# Patient Record
Sex: FEMALE | Race: WHITE | Employment: PART TIME | URBAN - NONMETROPOLITAN AREA
[De-identification: names, ages, dates, MRNs, and addresses within clinical notes are randomized per-mention and may not be internally consistent; named-entity substitution may affect disease eponyms.]

---

## 2017-01-19 ENCOUNTER — TELEPHONE (OUTPATIENT)
Dept: FAMILY MEDICINE CLINIC | Facility: CLINIC | Age: 67
End: 2017-01-19

## 2017-01-19 DIAGNOSIS — Z23 NEED FOR VACCINATION: Primary | ICD-10-CM

## 2017-01-19 RX ORDER — SULFAMETHOXAZOLE AND TRIMETHOPRIM 800; 160 MG/1; MG/1
1 TABLET ORAL 2 TIMES DAILY
Qty: 14 TABLET | Refills: 0 | Status: SHIPPED | OUTPATIENT
Start: 2017-01-19 | End: 2017-01-26

## 2017-01-19 NOTE — TELEPHONE ENCOUNTER
pt. was bit by the cat last/swollen and red/puncture wounds/claw marks. she is asking for antibotics. She is up at [place] with her , he is having surgery today.  Mirtha in [place]  45 and 32     Her cat last night bit and clawed her at right w

## 2017-05-22 ENCOUNTER — OFFICE VISIT (OUTPATIENT)
Dept: FAMILY MEDICINE CLINIC | Facility: CLINIC | Age: 67
End: 2017-05-22

## 2017-05-22 ENCOUNTER — LAB ENCOUNTER (OUTPATIENT)
Dept: LAB | Age: 67
End: 2017-05-22
Attending: INTERNAL MEDICINE
Payer: MEDICARE

## 2017-05-22 VITALS
SYSTOLIC BLOOD PRESSURE: 110 MMHG | RESPIRATION RATE: 18 BRPM | HEART RATE: 78 BPM | TEMPERATURE: 99 F | BODY MASS INDEX: 19.86 KG/M2 | HEIGHT: 63.5 IN | DIASTOLIC BLOOD PRESSURE: 72 MMHG | WEIGHT: 113.5 LBS

## 2017-05-22 DIAGNOSIS — M81.0 AGE-RELATED OSTEOPOROSIS WITHOUT CURRENT PATHOLOGICAL FRACTURE: ICD-10-CM

## 2017-05-22 DIAGNOSIS — Z23 NEED FOR VACCINATION: ICD-10-CM

## 2017-05-22 DIAGNOSIS — Z00.00 ENCOUNTER FOR ANNUAL HEALTH EXAMINATION: ICD-10-CM

## 2017-05-22 DIAGNOSIS — Z13.31 DEPRESSION SCREENING: ICD-10-CM

## 2017-05-22 DIAGNOSIS — L23.5 ALLERGIC DERMATITIS DUE TO OTHER CHEMICAL PRODUCT: ICD-10-CM

## 2017-05-22 DIAGNOSIS — E55.9 VITAMIN D DEFICIENCY: ICD-10-CM

## 2017-05-22 DIAGNOSIS — Z13.220 SCREENING FOR CHOLESTEROL LEVEL: ICD-10-CM

## 2017-05-22 DIAGNOSIS — L23.5 ALLERGIC DERMATITIS DUE TO OTHER CHEMICAL PRODUCT: Primary | ICD-10-CM

## 2017-05-22 DIAGNOSIS — Z12.31 VISIT FOR SCREENING MAMMOGRAM: ICD-10-CM

## 2017-05-22 DIAGNOSIS — Z78.0 POSTMENOPAUSAL: ICD-10-CM

## 2017-05-22 PROCEDURE — 82306 VITAMIN D 25 HYDROXY: CPT

## 2017-05-22 PROCEDURE — G0444 DEPRESSION SCREEN ANNUAL: HCPCS | Performed by: INTERNAL MEDICINE

## 2017-05-22 PROCEDURE — 36415 COLL VENOUS BLD VENIPUNCTURE: CPT

## 2017-05-22 PROCEDURE — G0009 ADMIN PNEUMOCOCCAL VACCINE: HCPCS | Performed by: INTERNAL MEDICINE

## 2017-05-22 PROCEDURE — 90670 PCV13 VACCINE IM: CPT | Performed by: INTERNAL MEDICINE

## 2017-05-22 PROCEDURE — G0439 PPPS, SUBSEQ VISIT: HCPCS | Performed by: INTERNAL MEDICINE

## 2017-05-22 PROCEDURE — 85025 COMPLETE CBC W/AUTO DIFF WBC: CPT

## 2017-05-22 PROCEDURE — 80061 LIPID PANEL: CPT

## 2017-05-22 PROCEDURE — 80053 COMPREHEN METABOLIC PANEL: CPT

## 2017-05-22 RX ORDER — PREDNISONE 20 MG/1
20 TABLET ORAL DAILY
Qty: 7 TABLET | Refills: 0 | Status: SHIPPED | OUTPATIENT
Start: 2017-05-22 | End: 2017-05-29

## 2017-05-22 NOTE — PROGRESS NOTES
HPI:   William Bains is a 74year old female who presents for a Medicare Subsequent Annual Wellness visit (Pt already had Initial Annual Wellness).     Pt has been in good health, she had a stress test last year, but turned out her had pneumonia on Ju heartburn  : denies dysuria, vaginal discharge or itching, no complaint of urinary incontinence   MUSCULOSKELETAL: denies back pain  NEURO: denies headaches  PSYCHE: denies depression or anxiety  HEMATOLOGIC: denies hx of anemia  ENDOCRINE: denies thyroi History   Administered Date(s) Administered   • Pneumococcal (Prevnar 13) 05/22/2017       ASSESSMENT AND OTHER RELEVANT CHRONIC CONDITIONS:   Maribel Madrigal is a 74year old female who presents for a Medicare Assessment.      PLAN SUMMARY:   Diagnoses and a Future    8. Postmenopausal  - Dexa Scan/Bone Density screening (Screening allowed every 2 years); Future    9. Vitamin D deficiency  Supplemented and recheck in 2 months. Ms. Bay Antoine does not currently take aspirin.  We discussed the risks and benefits of Yes    Hearing Problems?: No     Functional Status     Hearing Problems?: No    Vision Problems? : No    Difficulty walking?: No    Difficulty dressing or bathing?: No    Problems with daily activities? : No    Memory Problems?: No      Fall/Risk Assessmen 05/22/2017 98        EKG - w/ Initial Preventative Physical Exam only, or if medically necessary Electrocardiogram date       Colorectal Cancer Screening      Colonoscopy Screen every 10 years Colonoscopy,10 Years due on 04/20/2023 Update [facility] Kita Cuevas covered by Medicare Part B No vaccine history found This may be covered with your prescription benefits, but Medicare does not cover unless Medically needed    Zoster  Not covered by Medicare Part B No vaccine history found This may be covered with your ph

## 2017-05-22 NOTE — PATIENT INSTRUCTIONS
Warren Reid's SCREENING SCHEDULE   Tests on this list are recommended by your physician but may not be covered, or covered at this frequency, by your insurer. Please check with your insurance carrier before scheduling to verify coverage.    PREVENTATIVE if abnormal Colonoscopy,10 Years due on 04/20/2023 Update Health Maintenance if applicable    Flex Sigmoidoscopy Screen  Covered every 5 years No results found for this or any previous visit. No flowsheet data found.      Fecal Occult Blood   Covered Annual orders found for this or any previous visit. Please get once after your 65th birthday    Hepatitis B for Moderate/High Risk       No orders found for this or any previous visit.  Medium/high risk factors:   End-stage renal disease   Hemophiliacs who receive Procedure   Diabetes Screening      HbgA1C    At Least  Annually for Diabetics No results found for: A1C No flowsheet data found.     Fasting Blood Sugar (FSB)   Patient must be diagnosed with one of the following:   • Hypertension   • Dyslipidemia   • Obes Enema-   uncomfortable but covered  Covered but uncomfortable   Glaucoma Screening      Ophthalmology Visit   Covered annually for Diabetics, people with Glaucoma family history,   Americans over age 48   Americans over age 72 No flowsheet d institutions for the mentally retarded   Persons who live in the same house as a HepB virus carrier   Homosexual men   Illicit injectable drug abusers     Tetanus Toxoid- Only covered with a cut with metal- TD and TDaP Not covered by Medicare Part B)   Meme Davila

## 2017-05-23 PROBLEM — E55.9 VITAMIN D DEFICIENCY: Status: ACTIVE | Noted: 2017-05-23

## 2017-05-23 RX ORDER — ERGOCALCIFEROL 1.25 MG/1
50000 CAPSULE ORAL WEEKLY
Qty: 12 CAPSULE | Refills: 0 | Status: SHIPPED | OUTPATIENT
Start: 2017-05-23 | End: 2017-06-22

## 2017-05-23 NOTE — PROGRESS NOTES
Pt requested RX sent to [facility] in [place]. RX cx at [facility], [place], operated [facility] and sent to [address] [facility]

## 2017-06-12 ENCOUNTER — TELEPHONE (OUTPATIENT)
Dept: FAMILY MEDICINE CLINIC | Facility: CLINIC | Age: 67
End: 2017-06-12

## 2017-06-12 RX ORDER — PREDNISONE 20 MG/1
20 TABLET ORAL DAILY
Qty: 10 TABLET | Refills: 0 | Status: SHIPPED | OUTPATIENT
Start: 2017-06-12 | End: 2017-06-19

## 2017-06-12 NOTE — TELEPHONE ENCOUNTER
I can, but you must have some ingoing exposure like , makeup, sunscreen, See me again if it not improving.

## 2017-06-28 ENCOUNTER — OFFICE VISIT (OUTPATIENT)
Dept: FAMILY MEDICINE CLINIC | Facility: CLINIC | Age: 67
End: 2017-06-28

## 2017-06-28 VITALS
TEMPERATURE: 98 F | RESPIRATION RATE: 16 BRPM | DIASTOLIC BLOOD PRESSURE: 70 MMHG | HEART RATE: 90 BPM | BODY MASS INDEX: 19.95 KG/M2 | HEIGHT: 63.5 IN | SYSTOLIC BLOOD PRESSURE: 122 MMHG | WEIGHT: 114 LBS

## 2017-06-28 DIAGNOSIS — L30.8 OTHER ECZEMA: Primary | ICD-10-CM

## 2017-06-28 DIAGNOSIS — N76.0 ACUTE VAGINITIS: ICD-10-CM

## 2017-06-28 PROCEDURE — 99214 OFFICE O/P EST MOD 30 MIN: CPT | Performed by: INTERNAL MEDICINE

## 2017-06-28 RX ORDER — FLUCONAZOLE 150 MG/1
150 TABLET ORAL ONCE
Qty: 1 TABLET | Refills: 0 | Status: SHIPPED | OUTPATIENT
Start: 2017-06-28 | End: 2017-06-28

## 2017-06-28 NOTE — PROGRESS NOTES
James Serrato is a 74year old female. HPI:   Rash on her face is spreading. She had a peroral rash and now it is on the cheeks and circumorbital.  She used some prednisone which helped for a while, but now off and inflamed again.   She has a hx of eczema tenderness  EXTREMITIES: no cyanosis, clubbing or edema    ASSESSMENT AND PLAN:   Other eczema  (primary encounter diagnosis)  Acute vaginitis  Pt has been having some increased inflammatory changes of the skin on the face.   I have asked her to try a mediu

## 2018-02-01 ENCOUNTER — PATIENT OUTREACH (OUTPATIENT)
Dept: FAMILY MEDICINE CLINIC | Facility: CLINIC | Age: 68
End: 2018-02-01

## 2018-03-12 ENCOUNTER — TELEPHONE (OUTPATIENT)
Dept: FAMILY MEDICINE CLINIC | Facility: CLINIC | Age: 68
End: 2018-03-12

## 2018-03-12 NOTE — TELEPHONE ENCOUNTER
Patient said that she started over the weekend with a cough in her ribs and a cough. She denies a fever and no wheezing. She said she has a history of pneumonia. Offered and appointment tomorrow, she said she can't come in until Wed.  Appointment scheduled

## 2018-03-14 ENCOUNTER — OFFICE VISIT (OUTPATIENT)
Dept: FAMILY MEDICINE CLINIC | Facility: CLINIC | Age: 68
End: 2018-03-14

## 2018-03-14 VITALS
HEIGHT: 64.25 IN | BODY MASS INDEX: 19.63 KG/M2 | TEMPERATURE: 99 F | OXYGEN SATURATION: 96 % | DIASTOLIC BLOOD PRESSURE: 70 MMHG | HEART RATE: 82 BPM | WEIGHT: 115 LBS | SYSTOLIC BLOOD PRESSURE: 142 MMHG

## 2018-03-14 DIAGNOSIS — J44.1 COPD EXACERBATION (HCC): Primary | ICD-10-CM

## 2018-03-14 PROCEDURE — 99214 OFFICE O/P EST MOD 30 MIN: CPT | Performed by: INTERNAL MEDICINE

## 2018-03-14 RX ORDER — PREDNISONE 20 MG/1
20 TABLET ORAL DAILY
Qty: 7 TABLET | Refills: 0 | Status: SHIPPED | OUTPATIENT
Start: 2018-03-14 | End: 2018-03-21

## 2018-03-14 RX ORDER — CIPROFLOXACIN 500 MG/1
500 TABLET, FILM COATED ORAL 2 TIMES DAILY
Qty: 20 TABLET | Refills: 0 | Status: SHIPPED | OUTPATIENT
Start: 2018-03-14 | End: 2018-03-24

## 2018-03-14 RX ORDER — PROMETHAZINE HYDROCHLORIDE AND CODEINE PHOSPHATE 6.25; 1 MG/5ML; MG/5ML
2.5 SYRUP ORAL EVERY 4 HOURS PRN
Qty: 240 ML | Refills: 0 | Status: SHIPPED | OUTPATIENT
Start: 2018-03-14 | End: 2018-04-11 | Stop reason: ALTCHOICE

## 2018-03-14 NOTE — PROGRESS NOTES
HPI:   Nishant Charles is a 69year old female who presents for upper respiratory symptoms for  6  days. Patient reports congestion, dry cough, cough is keeping pt up at night.       Current Outpatient Prescriptions:  hydrocortisone 2.5 % External Cream Apply plan.  The patient is asked to return if sx's persist or worsen.

## 2018-04-11 ENCOUNTER — OFFICE VISIT (OUTPATIENT)
Dept: FAMILY MEDICINE CLINIC | Facility: CLINIC | Age: 68
End: 2018-04-11

## 2018-04-11 ENCOUNTER — HOSPITAL ENCOUNTER (OUTPATIENT)
Dept: GENERAL RADIOLOGY | Age: 68
Discharge: HOME | End: 2018-04-11
Attending: INTERNAL MEDICINE
Payer: MEDICARE

## 2018-04-11 VITALS
HEIGHT: 64 IN | WEIGHT: 111.38 LBS | BODY MASS INDEX: 19.01 KG/M2 | OXYGEN SATURATION: 98 % | TEMPERATURE: 99 F | SYSTOLIC BLOOD PRESSURE: 130 MMHG | DIASTOLIC BLOOD PRESSURE: 80 MMHG | HEART RATE: 67 BPM

## 2018-04-11 DIAGNOSIS — R07.1 CHEST PAIN ON BREATHING: ICD-10-CM

## 2018-04-11 DIAGNOSIS — R07.1 CHEST PAIN ON BREATHING: Primary | ICD-10-CM

## 2018-04-11 PROCEDURE — 99214 OFFICE O/P EST MOD 30 MIN: CPT | Performed by: INTERNAL MEDICINE

## 2018-04-11 PROCEDURE — 71046 X-RAY EXAM CHEST 2 VIEWS: CPT | Performed by: INTERNAL MEDICINE

## 2018-04-11 RX ORDER — PREDNISONE 20 MG/1
20 TABLET ORAL DAILY
Qty: 10 TABLET | Refills: 0 | Status: SHIPPED | OUTPATIENT
Start: 2018-04-11 | End: 2018-04-21

## 2018-04-11 NOTE — PROGRESS NOTES
HPI:   Adrien Meyers is a 69year old female who presents for upper respiratory symptoms for  1  months. Patient reports dry cough, coughing still when eating, pain in the right chest area now when she takes a deep breath.   She had a CXR without acute valenzuela not improved consider lung specialist and CT. [place] Marshall The patient indicates understanding of these issues and agrees to the plan. The patient is asked to return if sx's persist or worsen.

## 2018-04-24 ENCOUNTER — PATIENT OUTREACH (OUTPATIENT)
Dept: FAMILY MEDICINE CLINIC | Facility: CLINIC | Age: 68
End: 2018-04-24

## 2018-04-30 ENCOUNTER — TELEPHONE (OUTPATIENT)
Dept: FAMILY MEDICINE CLINIC | Facility: CLINIC | Age: 68
End: 2018-04-30

## 2018-04-30 DIAGNOSIS — J44.1 COPD EXACERBATION (HCC): Primary | ICD-10-CM

## 2018-04-30 NOTE — TELEPHONE ENCOUNTER
DID DR Johnny Castaneda EVER FIND ANYTHING WITH LOOKING AT OLD XRAY'S FROM WHEN SHE HAD PNEUMONIA LAST YEAR HAD TESTING DONE AT [facility]. SHE IS STILL NOT MUCH BETTER.    PLEASE ADVISE

## 2018-04-30 NOTE — TELEPHONE ENCOUNTER
No but this is way too long and she needs to be using a controller inhaler and be seen by pulmonology. I can refer to Dr Gus Bedolla and if wait is long then start inhaler myself.

## 2018-05-01 NOTE — TELEPHONE ENCOUNTER
Patient advised, she said that she got a message from the pharmacy that she has an inhaler waiting at the pharmacy that costs $360.  She said that she wants to know what is wrong with her, she is supposed to get a tooth pulled and said she needs to be in go

## 2018-05-01 NOTE — TELEPHONE ENCOUNTER
7/15/2016 Xray shows COPD changed as well as her last XRAY.    The previous XRAY reported pleural thickening which has resolved, BUT evaluation of the lungs includes ususaly PFT's and CT chest. I would like you to  a sample, inhalers are very expensi

## 2018-05-02 ENCOUNTER — MED REC SCAN ONLY (OUTPATIENT)
Dept: FAMILY MEDICINE CLINIC | Facility: CLINIC | Age: 68
End: 2018-05-02

## 2018-05-02 NOTE — TELEPHONE ENCOUNTER
Patient advised, referral faxed to Dr Nora Kelley. She said she does not want to start the inhaler until she sees the pulmonologist. She said she also had a CT scan of her chest in July 2016.

## 2018-06-06 ENCOUNTER — OFFICE VISIT (OUTPATIENT)
Dept: FAMILY MEDICINE CLINIC | Facility: CLINIC | Age: 68
End: 2018-06-06

## 2018-06-06 ENCOUNTER — LAB ENCOUNTER (OUTPATIENT)
Dept: LAB | Age: 68
End: 2018-06-06
Attending: INTERNAL MEDICINE
Payer: MEDICARE

## 2018-06-06 VITALS
DIASTOLIC BLOOD PRESSURE: 80 MMHG | WEIGHT: 111.38 LBS | BODY MASS INDEX: 19.01 KG/M2 | HEIGHT: 64 IN | SYSTOLIC BLOOD PRESSURE: 140 MMHG | OXYGEN SATURATION: 99 % | TEMPERATURE: 98 F | HEART RATE: 54 BPM

## 2018-06-06 DIAGNOSIS — Z11.59 NEED FOR HEPATITIS C SCREENING TEST: ICD-10-CM

## 2018-06-06 DIAGNOSIS — Z23 NEED FOR VACCINATION: ICD-10-CM

## 2018-06-06 DIAGNOSIS — K59.09 CHRONIC CONSTIPATION: ICD-10-CM

## 2018-06-06 DIAGNOSIS — E55.9 VITAMIN D DEFICIENCY: Primary | ICD-10-CM

## 2018-06-06 DIAGNOSIS — E55.9 VITAMIN D DEFICIENCY: ICD-10-CM

## 2018-06-06 DIAGNOSIS — Z00.00 ENCOUNTER FOR ANNUAL HEALTH EXAMINATION: ICD-10-CM

## 2018-06-06 DIAGNOSIS — Z12.11 SCREENING FOR COLON CANCER: ICD-10-CM

## 2018-06-06 PROCEDURE — 82306 VITAMIN D 25 HYDROXY: CPT

## 2018-06-06 PROCEDURE — 86803 HEPATITIS C AB TEST: CPT

## 2018-06-06 PROCEDURE — G0439 PPPS, SUBSEQ VISIT: HCPCS | Performed by: INTERNAL MEDICINE

## 2018-06-06 PROCEDURE — 36415 COLL VENOUS BLD VENIPUNCTURE: CPT

## 2018-06-06 PROCEDURE — G0009 ADMIN PNEUMOCOCCAL VACCINE: HCPCS | Performed by: INTERNAL MEDICINE

## 2018-06-06 PROCEDURE — 90732 PPSV23 VACC 2 YRS+ SUBQ/IM: CPT | Performed by: INTERNAL MEDICINE

## 2018-06-06 PROCEDURE — 80053 COMPREHEN METABOLIC PANEL: CPT

## 2018-06-06 PROCEDURE — 85025 COMPLETE CBC W/AUTO DIFF WBC: CPT

## 2018-06-06 PROCEDURE — 80061 LIPID PANEL: CPT

## 2018-06-06 NOTE — PROGRESS NOTES
HPI:   Fabien Bhakta is a 69year old female who presents for a Medicare Subsequent Annual Wellness visit (Pt already had Initial Annual Wellness). Pt hates these visits does not uunderstand why they are necessary.   Did start pneumonia vaccine last ye explanation and discussion of advance directives standard forms performed Face to Face with patient and Family/surrogate (if present), and forms available to patient in AVS         She smoked tobacco in the past but quit greater than 12 months ago.   Aguilar alcohol. She reports that she does not use drugs.      REVIEW OF SYSTEMS:   GENERAL: feels well otherwise  SKIN: denies any unusual skin lesions  EYES: denies blurred vision or double vision  HEENT: denies nasal congestion, sinus pain or ST  LUNGS: denies s non-tender, bowel sounds active all four quadrants,  no masses, no organomegaly   Pelvic: Deferred   Extremities: Extremities normal, atraumatic, no cyanosis or edema   Pulses: 2+ and symmetric   Skin: Skin color, texture, turgor normal, no rashes or lesio poor?: No  How does the patient maintain a good energy level?: Other  How would you describe your daily physical activity?: Heavy  How would you describe your current health state?: Good  How do you maintain positive mental well-being?: Social Interaction; applicable     Immunizations (Update Immunization Activity if applicable)     Influenza  Covered Annually  Please get every year    Pneumococcal 13 (Prevnar)  Covered Once after 65 05/22/2017 Please get once after your 65th birthday    Pneumococcal 23 (Pne

## 2018-06-06 NOTE — PATIENT INSTRUCTIONS
Larissa Reid's SCREENING SCHEDULE   Tests on this list are recommended by your physician but may not be covered, or covered at this frequency, by your insurer. Please check with your insurance carrier before scheduling to verify coverage.    PREVENTATIVE often if abnormal Colonoscopy,10 Years due on 04/20/2023 Update Health Maintenance if applicable    Flex Sigmoidoscopy Screen  Covered every 5 years No results found for this or any previous visit. No flowsheet data found.      Fecal Occult Blood   Covered (Pneumovax)  Covered Once after 65   Orders placed or performed in visit on 06/06/18  -PNEUMOCOCCAL IMM (PNEUMOVAX)    Please get once after your 65th birthday    Hepatitis B for Moderate/High Risk       No orders found for this or any previous visit.  Medi

## 2018-07-12 ENCOUNTER — MED REC SCAN ONLY (OUTPATIENT)
Dept: FAMILY MEDICINE CLINIC | Facility: CLINIC | Age: 68
End: 2018-07-12

## 2019-03-06 ENCOUNTER — PATIENT OUTREACH (OUTPATIENT)
Dept: FAMILY MEDICINE CLINIC | Facility: CLINIC | Age: 69
End: 2019-03-06

## 2019-03-19 ENCOUNTER — PATIENT OUTREACH (OUTPATIENT)
Dept: FAMILY MEDICINE CLINIC | Facility: CLINIC | Age: 69
End: 2019-03-19

## 2019-06-12 ENCOUNTER — OFFICE VISIT (OUTPATIENT)
Dept: FAMILY MEDICINE CLINIC | Facility: CLINIC | Age: 69
End: 2019-06-12
Payer: MEDICARE

## 2019-06-12 ENCOUNTER — LAB ENCOUNTER (OUTPATIENT)
Dept: LAB | Age: 69
End: 2019-06-12
Attending: INTERNAL MEDICINE
Payer: MEDICARE

## 2019-06-12 VITALS
HEART RATE: 67 BPM | DIASTOLIC BLOOD PRESSURE: 88 MMHG | TEMPERATURE: 99 F | SYSTOLIC BLOOD PRESSURE: 138 MMHG | BODY MASS INDEX: 19.04 KG/M2 | HEIGHT: 64 IN | WEIGHT: 111.5 LBS | OXYGEN SATURATION: 98 %

## 2019-06-12 DIAGNOSIS — J44.1 COPD EXACERBATION (HCC): ICD-10-CM

## 2019-06-12 DIAGNOSIS — E55.9 VITAMIN D DEFICIENCY: ICD-10-CM

## 2019-06-12 DIAGNOSIS — Z00.00 ENCOUNTER FOR ANNUAL HEALTH EXAMINATION: ICD-10-CM

## 2019-06-12 DIAGNOSIS — E55.9 VITAMIN D DEFICIENCY: Primary | ICD-10-CM

## 2019-06-12 DIAGNOSIS — K59.09 CHRONIC CONSTIPATION: ICD-10-CM

## 2019-06-12 DIAGNOSIS — Z12.31 VISIT FOR SCREENING MAMMOGRAM: ICD-10-CM

## 2019-06-12 PROCEDURE — 82306 VITAMIN D 25 HYDROXY: CPT

## 2019-06-12 PROCEDURE — 85025 COMPLETE CBC W/AUTO DIFF WBC: CPT

## 2019-06-12 PROCEDURE — 80061 LIPID PANEL: CPT

## 2019-06-12 PROCEDURE — 80053 COMPREHEN METABOLIC PANEL: CPT

## 2019-06-12 PROCEDURE — 36415 COLL VENOUS BLD VENIPUNCTURE: CPT

## 2019-06-12 PROCEDURE — G0439 PPPS, SUBSEQ VISIT: HCPCS | Performed by: INTERNAL MEDICINE

## 2019-06-12 NOTE — PATIENT INSTRUCTIONS
Semaj Reid's SCREENING SCHEDULE   Tests on this list are recommended by your physician but may not be covered, or covered at this frequency, by your insurer. Please check with your insurance carrier before scheduling to verify coverage.    PREVENTATIVE Colonoscopy due on 08/06/2028 Update [facility] if applicable    Flex Sigmoidoscopy Screen  Covered every 5 years No results found for this or any previous visit. No flowsheet data found.      Fecal Occult Blood   Covered Annually No results found f 70 Orders placed or performed in visit on 06/06/18   • PNEUMOCOCCAL IMM (PNEUMOVAX)    Please get once after your 65th birthday    Hepatitis B for Moderate/High Risk       No orders found for this or any previous visit.  Medium/high risk factors:   End-stag

## 2019-06-12 NOTE — PROGRESS NOTES
HPI:   Amira Juan is a 77year old female who presents for a Medicare Subsequent Annual Wellness visit (Pt already had Initial Annual Wellness). Pt has been fine. She has complains. She is irritated by this appointment.   Daughter was positive for and had a score of 0 so is at low risk.     Patient Care Team: Patient Care Team:  Marco Sexton MD as PCP - [facility])  Marco Sexton MD as PCP - [facility]    Patient Active Problem List:     Eczema     Vitamin D deficiency    Wt Readings from depression or anxiety  HEMATOLOGIC: denies hx of anemia  ENDOCRINE: denies thyroid history  ALL/ASTHMA: denies hx of allergy or asthma    EXAM:   /80 (BP Location: Right arm, Patient Position: Sitting, Cuff Size: adult)   Pulse 67   Temp 99 °F (37.2 MULTIDOSE VIAL (79822) FLU CLINIC 10/07/2016   • Pneumovax 23 10/07/2016   • Td, Preserv Free 05/22/2017        ASSESSMENT AND OTHER RELEVANT CHRONIC CONDITIONS:   Maribel Madrigal is a 77year old female who presents for a Medicare Assessment.      PLAN [facility] Disease Screening     LDL Annually LDL Cholesterol (mg/dL)   Date Value   06/06/2018 90        EKG - w/ Initial Preventative Physical Exam only, or if medically necessary Electrocardiogram date       Colorectal Cancer Screening      Colonoscopy Screen ever with a cut with metal- TD and TDaP Not covered by Medicare Part B No vaccine history found This may be covered with your prescription benefits, but Medicare does not cover unless Medically needed    Zoster  Not covered by Medicare Part B No vaccine history

## 2019-07-12 ENCOUNTER — PATIENT OUTREACH (OUTPATIENT)
Dept: FAMILY MEDICINE CLINIC | Facility: CLINIC | Age: 69
End: 2019-07-12

## 2019-07-12 NOTE — PROGRESS NOTES
Left message on answering machine for patient to return phone call. Patient is overdue for mammogram.  No future appointments.

## 2019-08-13 ENCOUNTER — APPOINTMENT (OUTPATIENT)
Dept: GENERAL RADIOLOGY | Facility: HOSPITAL | Age: 69
End: 2019-08-13
Attending: EMERGENCY MEDICINE
Payer: OTHER MISCELLANEOUS

## 2019-08-13 ENCOUNTER — HOSPITAL ENCOUNTER (EMERGENCY)
Facility: HOSPITAL | Age: 69
Discharge: HOME | End: 2019-08-13
Attending: EMERGENCY MEDICINE
Payer: OTHER MISCELLANEOUS

## 2019-08-13 VITALS
HEART RATE: 78 BPM | DIASTOLIC BLOOD PRESSURE: 76 MMHG | RESPIRATION RATE: 16 BRPM | SYSTOLIC BLOOD PRESSURE: 129 MMHG | OXYGEN SATURATION: 100 % | TEMPERATURE: 98 F
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
## 2019-08-13 DIAGNOSIS — S20.212A CONTUSION OF LEFT CHEST WALL, INITIAL ENCOUNTER: ICD-10-CM

## 2019-08-13 DIAGNOSIS — S62.102A CLOSED FRACTURE OF LEFT WRIST, INITIAL ENCOUNTER: ICD-10-CM

## 2019-08-13 DIAGNOSIS — S00.83XA CONTUSION OF FACE, INITIAL ENCOUNTER: ICD-10-CM

## 2019-08-13 PROCEDURE — 73110 X-RAY EXAM OF WRIST: CPT | Performed by: EMERGENCY MEDICINE

## 2019-08-13 PROCEDURE — 99284 EMERGENCY DEPT VISIT MOD MDM: CPT

## 2019-08-13 PROCEDURE — 71101 X-RAY EXAM UNILAT RIBS/CHEST: CPT | Performed by: EMERGENCY MEDICINE

## 2019-08-13 PROCEDURE — 96374 THER/PROPH/DIAG INJ IV PUSH: CPT

## 2019-08-13 PROCEDURE — 29105 APPLICATION LONG ARM SPLINT: CPT

## 2019-08-13 RX ORDER — KETOROLAC TROMETHAMINE 30 MG/ML
15 INJECTION, SOLUTION INTRAMUSCULAR; INTRAVENOUS ONCE
Status: COMPLETED | OUTPATIENT
Start: 2019-08-13 | End: 2019-08-13

## 2019-08-13 RX ORDER — TRAMADOL HYDROCHLORIDE 50 MG/1
TABLET ORAL EVERY 6 HOURS PRN
Qty: 10 TABLET | Refills: 0 | Status: SHIPPED | OUTPATIENT
Start: 2019-08-13 | End: 2019-08-20

## 2019-08-13 RX ORDER — NAPROXEN 500 MG/1
500 TABLET ORAL 2 TIMES DAILY PRN
Qty: 20 TABLET | Refills: 0 | Status: SHIPPED | OUTPATIENT
Start: 2019-08-13 | End: 2019-08-20

## 2019-08-13 NOTE — ED PROVIDER NOTES
Patient Seen in: [facility] Emergency Department    History   Patient presents with:  Fall (musculoskeletal, neurologic)    Stated Complaint: Fall    HPI    68-year-old female presents to the emergency department after a trip and fall.   Patient stat Eyes: Pupils are equal, round, and reactive to light. EOM are normal.   Neck: Normal range of motion. Neck supple. Cardiovascular: Normal rate, regular rhythm, normal heart sounds and intact distal pulses.    Pulmonary/Chest: Effort normal and breath soun PROCEDURE:  XR WRIST COMPLETE (MIN 3 VIEWS), LEFT (CPT=73110)  TECHNIQUE:  Three views were obtained. COMPARISON:  None.   INDICATIONS:  Fall  PATIENT STATED HISTORY: (As transcribed by Technologist)  Patient fell at work and landed on her left wrist and r Patient had had an IV established prior to coming to the emergency department. She does not wish to have any \"strong\" pain medications. She was given a dose of Toradol. She had ice applied to her wrist.  She had the above x-rays.   I reviewed the resul

## 2019-08-14 ENCOUNTER — TELEPHONE (OUTPATIENT)
Dept: FAMILY MEDICINE CLINIC | Facility: CLINIC | Age: 69
End: 2019-08-14

## 2019-08-14 DIAGNOSIS — S62.102S CLOSED FRACTURE OF LEFT WRIST, SEQUELA: Primary | ICD-10-CM

## 2019-08-14 DIAGNOSIS — Z78.0 POST-MENOPAUSAL: ICD-10-CM

## 2019-08-14 NOTE — TELEPHONE ENCOUNTER
Patient fell and was at [facility] yesterday, she has a wrist fracture. She is looking to get ij to see an orthopedic today. Appointment scheduled with Mykel LAYNE with Dr Donald Costa with [address] at 215pm today in [place]. Referral placed.  H

## 2019-08-16 ENCOUNTER — HOSPITAL ENCOUNTER (OUTPATIENT)
Dept: CT IMAGING | Age: 69
Discharge: HOME | End: 2019-08-16
Attending: PHYSICIAN ASSISTANT
Payer: OTHER MISCELLANEOUS

## 2019-08-16 DIAGNOSIS — S52.502A CLOSED FRACTURE OF DISTAL END OF LEFT RADIUS, UNSPECIFIED FRACTURE MORPHOLOGY, INITIAL ENCOUNTER: ICD-10-CM

## 2019-08-16 PROCEDURE — 76376 3D RENDER W/INTRP POSTPROCES: CPT

## 2019-08-16 PROCEDURE — 73200 CT UPPER EXTREMITY W/O DYE: CPT | Performed by: PHYSICIAN ASSISTANT

## 2019-08-20 PROBLEM — S52.502D CLOSED FRACTURE OF DISTAL END OF LEFT RADIUS WITH ROUTINE HEALING, UNSPECIFIED FRACTURE MORPHOLOGY, SUBSEQUENT ENCOUNTER: Status: ACTIVE | Noted: 2019-08-20

## 2019-08-20 PROBLEM — S52.572A OTHER CLOSED INTRA-ARTICULAR FRACTURE OF DISTAL END OF LEFT RADIUS, INITIAL ENCOUNTER: Status: ACTIVE | Noted: 2019-08-20

## 2019-08-22 ENCOUNTER — OFFICE VISIT (OUTPATIENT)
Dept: FAMILY MEDICINE CLINIC | Facility: CLINIC | Age: 69
End: 2019-08-22
Payer: OTHER MISCELLANEOUS

## 2019-08-22 VITALS
RESPIRATION RATE: 12 BRPM | HEART RATE: 72 BPM | BODY MASS INDEX: 18.78 KG/M2 | WEIGHT: 110 LBS | SYSTOLIC BLOOD PRESSURE: 124 MMHG | TEMPERATURE: 98 F | HEIGHT: 64 IN | DIASTOLIC BLOOD PRESSURE: 80 MMHG

## 2019-08-22 DIAGNOSIS — S62.101G CLOSED FRACTURE OF RIGHT WRIST WITH DELAYED HEALING, SUBSEQUENT ENCOUNTER: Primary | ICD-10-CM

## 2019-08-22 DIAGNOSIS — J43.2 CENTRILOBULAR EMPHYSEMA (HCC): ICD-10-CM

## 2019-08-22 LAB
ALBUMIN SERPL-MCNC: 4.1 G/DL (ref 3.4–5)
ALBUMIN/GLOB SERPL: 1.2 {RATIO} (ref 1–2)
ALP LIVER SERPL-CCNC: 167 U/L (ref 55–142)
ALT SERPL-CCNC: 23 U/L (ref 13–56)
ANION GAP SERPL CALC-SCNC: 6 MMOL/L (ref 0–18)
AST SERPL-CCNC: 21 U/L (ref 15–37)
BASOPHILS # BLD AUTO: 0.03 X10(3) UL (ref 0–0.2)
BASOPHILS NFR BLD AUTO: 0.5 %
BILIRUB SERPL-MCNC: 0.6 MG/DL (ref 0.1–2)
BUN BLD-MCNC: 13 MG/DL (ref 7–18)
BUN/CREAT SERPL: 21.7 (ref 10–20)
CALCIUM BLD-MCNC: 9.1 MG/DL (ref 8.5–10.1)
CHLORIDE SERPL-SCNC: 104 MMOL/L (ref 98–112)
CO2 SERPL-SCNC: 30 MMOL/L (ref 21–32)
CREAT BLD-MCNC: 0.6 MG/DL (ref 0.55–1.02)
DEPRECATED RDW RBC AUTO: 41 FL (ref 35.1–46.3)
EOSINOPHIL # BLD AUTO: 0.11 X10(3) UL (ref 0–0.7)
EOSINOPHIL NFR BLD AUTO: 2 %
ERYTHROCYTE [DISTWIDTH] IN BLOOD BY AUTOMATED COUNT: 12 % (ref 11–15)
GLOBULIN PLAS-MCNC: 3.4 G/DL (ref 2.8–4.4)
GLUCOSE BLD-MCNC: 93 MG/DL (ref 70–99)
HCT VFR BLD AUTO: 37.9 % (ref 35–48)
HGB BLD-MCNC: 12.5 G/DL (ref 12–16)
IMM GRANULOCYTES # BLD AUTO: 0.01 X10(3) UL (ref 0–1)
IMM GRANULOCYTES NFR BLD: 0.2 %
LYMPHOCYTES # BLD AUTO: 1.52 X10(3) UL (ref 1–4)
LYMPHOCYTES NFR BLD AUTO: 27.1 %
M PROTEIN MFR SERPL ELPH: 7.5 G/DL (ref 6.4–8.2)
MCH RBC QN AUTO: 30.3 PG (ref 26–34)
MCHC RBC AUTO-ENTMCNC: 33 G/DL (ref 31–37)
MCV RBC AUTO: 91.8 FL (ref 80–100)
MONOCYTES # BLD AUTO: 0.44 X10(3) UL (ref 0.1–1)
MONOCYTES NFR BLD AUTO: 7.9 %
NEUTROPHILS # BLD AUTO: 3.49 X10 (3) UL (ref 1.5–7.7)
NEUTROPHILS # BLD AUTO: 3.49 X10(3) UL (ref 1.5–7.7)
NEUTROPHILS NFR BLD AUTO: 62.3 %
OSMOLALITY SERPL CALC.SUM OF ELEC: 290 MOSM/KG (ref 275–295)
PLATELET # BLD AUTO: 373 10(3)UL (ref 150–450)
POTASSIUM SERPL-SCNC: 3.9 MMOL/L (ref 3.5–5.1)
RBC # BLD AUTO: 4.13 X10(6)UL (ref 3.8–5.3)
SODIUM SERPL-SCNC: 140 MMOL/L (ref 136–145)
WBC # BLD AUTO: 5.6 X10(3) UL (ref 4–11)

## 2019-08-22 PROCEDURE — 80053 COMPREHEN METABOLIC PANEL: CPT | Performed by: INTERNAL MEDICINE

## 2019-08-22 PROCEDURE — 99214 OFFICE O/P EST MOD 30 MIN: CPT | Performed by: INTERNAL MEDICINE

## 2019-08-22 PROCEDURE — 85025 COMPLETE CBC W/AUTO DIFF WBC: CPT | Performed by: INTERNAL MEDICINE

## 2019-08-22 PROCEDURE — 93000 ELECTROCARDIOGRAM COMPLETE: CPT | Performed by: INTERNAL MEDICINE

## 2019-08-22 RX ORDER — IBUPROFEN 200 MG
200 TABLET ORAL EVERY 6 HOURS PRN
COMMUNITY

## 2019-08-22 NOTE — PROGRESS NOTES
Romero Whitley is a 68year old female who presents for a pre-operative physical exam. Patient is to have right wrist repair, to be done by Dr. Mikey Dolan at [address] HPI:   Pt complains of right wrist fracture 8/13/2019 at work.    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

## 2019-08-26 ENCOUNTER — TELEPHONE (OUTPATIENT)
Dept: FAMILY MEDICINE CLINIC | Facility: CLINIC | Age: 69
End: 2019-08-26

## 2019-08-27 ENCOUNTER — TELEPHONE (OUTPATIENT)
Dept: FAMILY MEDICINE CLINIC | Facility: CLINIC | Age: 69
End: 2019-08-27

## 2019-09-03 ENCOUNTER — MED REC SCAN ONLY (OUTPATIENT)
Dept: FAMILY MEDICINE CLINIC | Facility: CLINIC | Age: 69
End: 2019-09-03

## 2020-01-29 ENCOUNTER — OFFICE VISIT (OUTPATIENT)
Dept: FAMILY MEDICINE CLINIC | Facility: CLINIC | Age: 70
End: 2020-01-29
Payer: MEDICARE

## 2020-01-29 VITALS
WEIGHT: 109.13 LBS | RESPIRATION RATE: 20 BRPM | BODY MASS INDEX: 19 KG/M2 | TEMPERATURE: 99 F | DIASTOLIC BLOOD PRESSURE: 74 MMHG | HEART RATE: 68 BPM | SYSTOLIC BLOOD PRESSURE: 140 MMHG

## 2020-01-29 DIAGNOSIS — G89.29 CHRONIC RIGHT-SIDED THORACIC BACK PAIN: Primary | ICD-10-CM

## 2020-01-29 DIAGNOSIS — J43.2 CENTRILOBULAR EMPHYSEMA (HCC): ICD-10-CM

## 2020-01-29 DIAGNOSIS — M54.6 CHRONIC RIGHT-SIDED THORACIC BACK PAIN: Primary | ICD-10-CM

## 2020-01-29 PROCEDURE — 99214 OFFICE O/P EST MOD 30 MIN: CPT | Performed by: INTERNAL MEDICINE

## 2020-01-29 NOTE — PROGRESS NOTES
Judith Nugent is a 68year old female. HPI:   Pt had a fracture in the left arm in August, hand has not recovered completely. Has back pain with sitting for long periods of time, worse in the thoracic spine. She has not yet done a DEXA scan.   She takes a bruits  LUNGS: clear to auscultation  CARDIO: RRR without murmur  GI: good BS's,no masses, HSM or tenderness  EXTREMITIES: no cyanosis, clubbing or edema    ASSESSMENT AND PLAN:     Chronic right-sided thoracic back pain  (primary encounter diagnosis)  Shweta

## 2020-01-30 ENCOUNTER — HOSPITAL ENCOUNTER (OUTPATIENT)
Dept: GENERAL RADIOLOGY | Age: 70
Discharge: HOME | End: 2020-01-30
Attending: INTERNAL MEDICINE
Payer: MEDICARE

## 2020-01-30 ENCOUNTER — HOSPITAL ENCOUNTER (OUTPATIENT)
Dept: BONE DENSITY | Age: 70
Discharge: HOME | End: 2020-01-30
Attending: INTERNAL MEDICINE
Payer: MEDICARE

## 2020-01-30 DIAGNOSIS — G89.29 CHRONIC RIGHT-SIDED THORACIC BACK PAIN: ICD-10-CM

## 2020-01-30 DIAGNOSIS — Z78.0 POST-MENOPAUSAL: ICD-10-CM

## 2020-01-30 DIAGNOSIS — M54.6 CHRONIC RIGHT-SIDED THORACIC BACK PAIN: ICD-10-CM

## 2020-01-30 PROCEDURE — 77080 DXA BONE DENSITY AXIAL: CPT | Performed by: INTERNAL MEDICINE

## 2020-01-30 PROCEDURE — 72072 X-RAY EXAM THORAC SPINE 3VWS: CPT | Performed by: INTERNAL MEDICINE

## 2020-01-30 RX ORDER — ALENDRONATE SODIUM 70 MG/1
70 TABLET ORAL WEEKLY
Qty: 12 TABLET | Refills: 3 | Status: SHIPPED | OUTPATIENT
Start: 2020-01-30 | End: 2020-04-29

## 2020-02-03 ENCOUNTER — TELEPHONE (OUTPATIENT)
Dept: FAMILY MEDICINE CLINIC | Facility: CLINIC | Age: 70
End: 2020-02-03

## 2020-04-06 ENCOUNTER — TELEPHONE (OUTPATIENT)
Dept: FAMILY MEDICINE CLINIC | Facility: CLINIC | Age: 70
End: 2020-04-06

## 2020-04-06 RX ORDER — GENTAMICIN SULFATE 3 MG/ML
1 SOLUTION/ DROPS OPHTHALMIC 4 TIMES DAILY
Qty: 5 ML | Refills: 0 | Status: SHIPPED | OUTPATIENT
Start: 2020-04-06 | End: 2020-04-11

## 2020-04-06 NOTE — TELEPHONE ENCOUNTER
PT. THINKS SHE HAS A STYE ON HER EYE, ON UPPER LID. PT. AWARE IF DOCTOR CALLS BACK THEY WILL CHARGE INSURANCE FOR A PHONE VISIT AND SHE IS FINE WITH THAT.

## 2020-04-06 NOTE — TELEPHONE ENCOUNTER
Patient states that she has a stye on her upper eye lid x 8 days. Patient was using warm tea bags. Stye did drain. Eye lid is still swollen and red. Upon waking up, eye is crusted shut. Patient is ok with televisit if needed.

## 2020-11-05 ENCOUNTER — OFFICE VISIT (OUTPATIENT)
Dept: FAMILY MEDICINE CLINIC | Facility: CLINIC | Age: 70
End: 2020-11-05
Payer: MEDICARE

## 2020-11-05 VITALS
TEMPERATURE: 99 F | BODY MASS INDEX: 19.81 KG/M2 | HEART RATE: 87 BPM | HEIGHT: 64 IN | OXYGEN SATURATION: 98 % | RESPIRATION RATE: 16 BRPM | DIASTOLIC BLOOD PRESSURE: 70 MMHG | WEIGHT: 116 LBS | SYSTOLIC BLOOD PRESSURE: 116 MMHG

## 2020-11-05 DIAGNOSIS — G89.29 CHRONIC RIGHT-SIDED THORACIC BACK PAIN: ICD-10-CM

## 2020-11-05 DIAGNOSIS — M54.6 CHRONIC RIGHT-SIDED THORACIC BACK PAIN: ICD-10-CM

## 2020-11-05 DIAGNOSIS — J43.2 CENTRILOBULAR EMPHYSEMA (HCC): ICD-10-CM

## 2020-11-05 DIAGNOSIS — Z00.00 ENCOUNTER FOR ANNUAL HEALTH EXAMINATION: Primary | ICD-10-CM

## 2020-11-05 DIAGNOSIS — Z78.0 POST-MENOPAUSAL: ICD-10-CM

## 2020-11-05 DIAGNOSIS — K59.09 CHRONIC CONSTIPATION: ICD-10-CM

## 2020-11-05 DIAGNOSIS — E55.9 VITAMIN D DEFICIENCY: ICD-10-CM

## 2020-11-05 PROCEDURE — G0439 PPPS, SUBSEQ VISIT: HCPCS | Performed by: INTERNAL MEDICINE

## 2020-11-05 RX ORDER — POLYETHYLENE GLYCOL 3350 17 G/17G
17 POWDER, FOR SOLUTION ORAL DAILY
Qty: 527 G | Refills: 0 | Status: SHIPPED | OUTPATIENT
Start: 2020-11-05 | End: 2020-11-08

## 2020-11-05 NOTE — PROGRESS NOTES
HPI:   Adrien Meyers is a 65year old female who presents for a Medicare Subsequent Annual Wellness visit (Pt already had Initial Annual Wellness). Pt has been well, she has osteoporosis and had a wrist fracture left with pinning, slipped at work.  She Use      Smoking status: Former Smoker        Packs/day: 1.00        Years: 10.00        Pack years: 10      Smokeless tobacco: Never Used         CAGE Alcohol screening   Gayle Henson was screened for Alcohol abuse and had a score of 0 so is at low risk. Cancer in her father; Other in her mother. SOCIAL HISTORY:   She  reports that she has quit smoking. She has a 10.00 pack-year smoking history. She has never used smokeless tobacco. She reports current alcohol use.  She reports that she does not use drugs examination         Diet assessment: good     PLAN:  The patient indicates understanding of these issues and agrees to the plan. Reinforced healthy diet, lifestyle, and exercise. No follow-ups on file.      Nasrin Meneses MD, 11/5/2020     [facility] There are no preventive care reminders to display for this patient. Update Health Maintenance if applicable    Chlamydia  Annually if high risk No results found for: CHLAMYDIA No flowsheet data found.     Screening Mammogram      Mammogram Annually to 74, t

## 2020-11-05 NOTE — PATIENT INSTRUCTIONS
Kimmy Reid's SCREENING SCHEDULE   Tests on this list are recommended by your physician but may not be covered, or covered at this frequency, by your insurer. Please check with your insurance carrier before scheduling to verify coverage.    PREVENTATIVE abnormal Colonoscopy due on 08/06/2028 Update [facility] if applicable    Flex Sigmoidoscopy Screen  Covered every 5 years No results found for this or any previous visit. No flowsheet data found.      Fecal Occult Blood   Covered Annually No result (Pneumovax)  Covered Once after 65 Orders placed or performed in visit on 06/06/18   • PNEUMOCOCCAL IMM (PNEUMOVAX)    Please get once after your 65th birthday    Hepatitis B for Moderate/High Risk       No orders found for this or any previous visit.  Medi

## 2021-03-12 DIAGNOSIS — Z23 NEED FOR VACCINATION: ICD-10-CM

## 2021-05-11 ENCOUNTER — TELEPHONE (OUTPATIENT)
Dept: FAMILY MEDICINE CLINIC | Facility: CLINIC | Age: 71
End: 2021-05-11

## 2021-05-11 NOTE — TELEPHONE ENCOUNTER
Luis Jurado advised. She said she did tell the  at the hospital that patient will need an OV with Dr Estephania Rose.

## 2021-06-02 ENCOUNTER — OFFICE VISIT (OUTPATIENT)
Dept: FAMILY MEDICINE CLINIC | Facility: CLINIC | Age: 71
End: 2021-06-02
Payer: MEDICARE

## 2021-06-02 VITALS
BODY MASS INDEX: 19.16 KG/M2 | TEMPERATURE: 98 F | HEIGHT: 65 IN | RESPIRATION RATE: 18 BRPM | HEART RATE: 86 BPM | SYSTOLIC BLOOD PRESSURE: 122 MMHG | DIASTOLIC BLOOD PRESSURE: 60 MMHG | WEIGHT: 115 LBS | OXYGEN SATURATION: 98 %

## 2021-06-02 DIAGNOSIS — J43.2 CENTRILOBULAR EMPHYSEMA (HCC): ICD-10-CM

## 2021-06-02 DIAGNOSIS — Z87.81 S/P LEFT HIP FRACTURE: Primary | ICD-10-CM

## 2021-06-02 PROCEDURE — 99214 OFFICE O/P EST MOD 30 MIN: CPT | Performed by: INTERNAL MEDICINE

## 2021-06-02 RX ORDER — ACETAMINOPHEN 500 MG
500 TABLET ORAL DAILY
COMMUNITY
Start: 2021-05-11

## 2021-06-02 RX ORDER — PSEUDOEPHEDRINE HCL 30 MG
100 TABLET ORAL AS NEEDED
COMMUNITY
Start: 2021-05-11

## 2021-06-03 ENCOUNTER — TELEPHONE (OUTPATIENT)
Dept: FAMILY MEDICINE CLINIC | Facility: CLINIC | Age: 71
End: 2021-06-03

## 2021-06-03 PROBLEM — Z87.81 S/P LEFT HIP FRACTURE: Status: ACTIVE | Noted: 2021-05-08

## 2021-06-14 ENCOUNTER — MED REC SCAN ONLY (OUTPATIENT)
Dept: FAMILY MEDICINE CLINIC | Facility: CLINIC | Age: 71
End: 2021-06-14

## 2021-06-14 ENCOUNTER — TELEPHONE (OUTPATIENT)
Dept: FAMILY MEDICINE CLINIC | Facility: CLINIC | Age: 71
End: 2021-06-14

## 2021-06-29 ENCOUNTER — MED REC SCAN ONLY (OUTPATIENT)
Dept: FAMILY MEDICINE CLINIC | Facility: CLINIC | Age: 71
End: 2021-06-29

## 2021-06-30 ENCOUNTER — MED REC SCAN ONLY (OUTPATIENT)
Dept: FAMILY MEDICINE CLINIC | Facility: CLINIC | Age: 71
End: 2021-06-30

## 2021-07-19 ENCOUNTER — MED REC SCAN ONLY (OUTPATIENT)
Dept: FAMILY MEDICINE CLINIC | Facility: CLINIC | Age: 71
End: 2021-07-19

## 2021-07-19 ENCOUNTER — TELEPHONE (OUTPATIENT)
Dept: FAMILY MEDICINE CLINIC | Facility: CLINIC | Age: 71
End: 2021-07-19

## 2021-07-19 NOTE — TELEPHONE ENCOUNTER
Spoke with pt. Pt states her Ortho doctor Dr. Luis M Chin wants labs on pt. Pt was asking if she can have her labs done before her visit. Advised we would need the orders from their office. Scheduled pt for tomorrow before her appointment.     Called Dr. Viola Jeffery

## 2021-07-20 ENCOUNTER — OFFICE VISIT (OUTPATIENT)
Dept: FAMILY MEDICINE CLINIC | Facility: CLINIC | Age: 71
End: 2021-07-20
Payer: MEDICARE

## 2021-07-20 VITALS
WEIGHT: 113 LBS | SYSTOLIC BLOOD PRESSURE: 120 MMHG | HEIGHT: 65 IN | DIASTOLIC BLOOD PRESSURE: 62 MMHG | RESPIRATION RATE: 18 BRPM | BODY MASS INDEX: 18.83 KG/M2 | TEMPERATURE: 99 F | OXYGEN SATURATION: 97 % | HEART RATE: 72 BPM

## 2021-07-20 DIAGNOSIS — E55.9 VITAMIN D DEFICIENCY: ICD-10-CM

## 2021-07-20 DIAGNOSIS — Z87.81 S/P LEFT HIP FRACTURE: Primary | ICD-10-CM

## 2021-07-20 DIAGNOSIS — M80.00XD AGE-RELATED OSTEOPOROSIS WITH CURRENT PATHOLOGICAL FRACTURE WITH ROUTINE HEALING, SUBSEQUENT ENCOUNTER: ICD-10-CM

## 2021-07-20 PROCEDURE — 99213 OFFICE O/P EST LOW 20 MIN: CPT | Performed by: INTERNAL MEDICINE

## 2021-07-20 NOTE — PROGRESS NOTES
Cherri Lopez is a 65year old female. HPI:   Pt has been recovering from a hip fracture left. She is still using s walker and doing physical therapy. She is leaving the state in mid August after selling her home and looking to relocate to [place].   She may kg/m²   GENERAL: well developed, well nourished,in no apparent distress  SKIN: no rashes,no suspicious lesions  HEENT: atraumatic, normocephalic,ears and throat are clear  NECK: supple,no adenopathy,no bruits  LUNGS: clear to auscultation  CARDIO: RRR with

## 2021-10-04 ENCOUNTER — TELEPHONE (OUTPATIENT)
Dept: FAMILY MEDICINE CLINIC | Facility: CLINIC | Age: 71
End: 2021-10-04

## 2021-10-04 NOTE — TELEPHONE ENCOUNTER
Pt calls asking for her records to be sent to her new doctor. She has moved to [place]. Records release mailed to pt. PCP removal form filled out.

## 2021-10-19 ENCOUNTER — TELEPHONE (OUTPATIENT)
Dept: FAMILY MEDICINE CLINIC | Facility: CLINIC | Age: 71
End: 2021-10-19

## 2021-10-19 NOTE — TELEPHONE ENCOUNTER
[facility] sent us a medical record request to send the records to RONNY WILKES, Dr Marivel Gil, from 2008 to present.   Sent to scan stat 10/20/21

## (undated) NOTE — LETTER
Date & Time: 8/13/2019, 5:42 PM  Patient: Lonnell Klinefelter  Encounter Provider(s):    Xiomy Romero MD       To Whom It May Concern:    Oscar Alan was seen and treated in our department on 8/13/2019.  She can return to work with these limitations: no use

## (undated) NOTE — MR AVS SNAPSHOT
[address] [place] [address]  [phone]               Thank you for choosing us for your health care visit with Dilshad Reynolds MD.  We are glad to serve you and happy to provide you with this summary o Need for vaccination        Screening for cholesterol level        Age-related osteoporosis without current pathological fracture        Postmenopausal          Instructions and Information about [facility] • Men who are 73-68 years old and have smoked more than 100 cigarettes in their lifetime   • Anyone with a family history    Colorectal Cancer Screening  Covered up to Age 76     Colonoscopy Screen   Covered every 10 years- more often if abnormal Colonosco Covered Annually No orders found for this or any previous visit. Please get every year    Pneumococcal 13 (Prevnar)  Covered Once after 65 No orders found for this or any previous visit.  Please get once after your 65th birthday    Pneumococcal 23 (Pneumova Tests on this list are recommended by your physician but may not be covered, or covered at this frequency, by your insurer. Please check with your insurance carrier before scheduling to verify coverage.    PREVENTATIVE SERVICES  INDICATIONS AND SCHEDULE Int Covered every 10 years- more often if abnormal Colonoscopy,10 Years due on 04/20/2023 Update Health Maintenance if applicable    Flex Sigmoidoscopy Screen  Covered every 5 years No results found for this or any previous visit. No flowsheet data found. Please get once after your 65th birthday    Pneumococcal 23 (Pneumovax)  Covered Once after 65 No orders found for this or any previous visit.  Please get once after your 65th birthday    Hepatitis B for Moderate/High Risk       No orders found for this or Current Medications          This list is accurate as of: 5/22/17 11:59 PM.  Always use your most recent med list.                Mometasone Furoate 0.1 % Crea   Apply BID x 10 days   Commonly known as:  ELOCON           predniSONE 20 MG Tabs   Take Calcium, Total 8.9 8.3-10.3 mg/dL      Total Calciums are not corrected for effects of low albumin. If needed, use the following correction formula.       Corrected Calcium Formula:      ((4.0 - Albumin) x 0.8 + Calcium    Note: Calculation is only valid w

## (undated) NOTE — LETTER
12/07/20        [address] [facility] [address]      Dear Dionne Anne,    [address] [place] [address] records indicate that you have outstanding lab work and or testing that was ordered for you and has not yet been completed:  Orders Placed This Encounter      C

## (undated) NOTE — LETTER
Date & Time: 8/13/2019, 6:19 PM  Patient: Aristides Prince  Encounter Provider(s):    Chely Mart MD       To Whom It May Concern:    Sammi Vergara was seen and treated in our department on 8/13/2019.  She may not return to work until cleared by orthopedic

## (undated) NOTE — ED AVS SNAPSHOT
Tiny Marquez   MRN: [id number]    Department:  [facility] Emergency Department   Date of Visit:  8/13/2019           Disclosure     Insurance plans vary and the physician(s) referred by the ER may not be covered by your plan.  Please contact your tell this physician (or your personal doctor if your instructions are to return to your personal doctor) about any new or lasting problems. The primary care or specialist physician will see patients referred from the [facility] Emergency Department.  Abdulaziz Paredes